# Patient Record
(demographics unavailable — no encounter records)

---

## 2024-12-04 NOTE — PHYSICAL EXAM
[de-identified] : Neurologic: normal mood and affect, orientated and able to communicate Constitutional: well developed and well nourished  Right Knee: patellar tenderness medial tibial plateau tenderness pes bursal swelling and tenderness

## 2024-12-04 NOTE — DATA REVIEWED
[FreeTextEntry1] : 12/3/24 [facility] X-RAY Right Knee 4 views: This scan was reviewed and interpreted by Dr. Farley, and his findings are- patella quita, otherwise unremarkable

## 2024-12-04 NOTE — ADDENDUM
[FreeTextEntry1] : Documented by Armando Henley acting as a scribe for Dr. Farley and Beni Cardoza PA-C on 12/03/2024 and was presence for the following sections: Physical Exam; Data Reviewed; Assessment; Discussion/Summary. All medical record entries made by the Scribe were at my, Dr. Farley, and Beni Cardoza, direction and personally dictated by me on 12/03/2024. I have reviewed the chart and agree that the record accurately reflects my personal performance of the history, physical exam, procedure and imaging. 6

## 2024-12-04 NOTE — ADDENDUM
[FreeTextEntry1] : Documented by Armando Henley acting as a scribe for Dr. Farley and Beni Cardoza PA-C on 12/03/2024 and was presence for the following sections: Physical Exam; Data Reviewed; Assessment; Discussion/Summary. All medical record entries made by the Scribe were at my, Dr. Farley, and Beni Cardoza, direction and personally dictated by me on 12/03/2024. I have reviewed the chart and agree that the record accurately reflects my personal performance of the history, physical exam, procedure and imaging.

## 2024-12-04 NOTE — HISTORY OF PRESENT ILLNESS
[de-identified] : The patient is a 17 year old [RIGHT] hand dominant male who presents today complaining of right knee pain Date of Injury/Onset: 11/27/24 Pain:    At Rest: _/10  With Activity:  _/10  Mechanism of injury: fell on knee during live wrestling This is NOT a Work Related Injury being treated under Worker's Compensation. This IS an athletic injury occurring associated with an interscholastic or organized sports team. Quality of symptoms: sharp pain over distal patella/medial patella border Improves with: rest, activity modification Worse with: inc walking, stairs, wrestling, kneeling Prior treatment: NONE Prior Imaging: NONE Out of work/sport: out of sport since Saturday School/Sport/Position/Occupation: [employer]  Additional Information: Previously treated by Miguelito in December 2023 for LT shoulder injury

## 2024-12-04 NOTE — DISCUSSION/SUMMARY
[de-identified] : Reviewed all X Ray images with patient today and interpretation was provided. MRI of right knee to eval medial tibia plateau stress fracture. No gym, no sport until follow-up visit. Patient will follow up after MRI.     ***[employer]***     ----------------------------------------------- Home Exercise The patient is instructed on a home exercise program.  GIANNA NERI Acting as a Scribe for Dr. Miguelito JONES, Gianna Neri, attest that this documentation has been prepared under the direction and in the presence of Provider Dr. Farley.  Activity Modification The patient was advised to modify their activities.  Dx / Natural History The patient was advised of the diagnosis. The natural history of the pathology was explained in full to the patient in layman's terms. Several different treatment options were discussed and explained in full to the patient including the risks and benefits of both surgical and non-surgical treatments.  All questions and concerns were answered.  Pain Guide Activities The patient was advised to let pain guide the gradual advancement of activities.  RICE I explained to the patient that rest, ice, compression, and elevation would benefit them. They may return to activity after follow-up or when they no longer have any pain.  The patient's current medication management of their orthopedic diagnosis was reviewed today: (1) We discussed a comprehensive treatment plan that included possible pharmaceutical management involving the use of prescription strength medications including but not limited to options such as oral Naprosyn 500mg BID, once daily Meloxicam 15 mg, or 500-650 mg Tylenol versus over the counter oral medications and topical prescription NSAID Pennsaid vs over the counter Voltaren gel. (2) There is a moderate risk of morbidity with further treatment, especially from use of prescription strength medications and possible side effects of these medications which include upset stomach with oral medications, skin reactions to topical medications and cardiac/renal issues with long term use. (3) I recommended that the patient follow-up with their medical physician to discuss any significant specific potential issues with long term medication use such as interactions with current medications or with exacerbation of underlying medical comorbidities. (4) The benefits and risks associated with use of injectable, oral or topical, prescription and over the counter anti-inflammatory medications were discussed with the patient. The patient voiced understanding of the risks including but not limited to bleeding, stroke, kidney dysfunction, heart disease, and were referred to the black box warning label for further information.

## 2024-12-04 NOTE — PHYSICAL EXAM
[de-identified] : Neurologic: normal mood and affect, orientated and able to communicate Constitutional: well developed and well nourished  Right Knee: patellar tenderness medial tibial plateau tenderness pes bursal swelling and tenderness

## 2024-12-04 NOTE — REASON FOR VISIT
Render Post-Care Instructions In Note?: no Detail Level: Detailed Consent: The patient's consent was obtained including but not limited to risks of crusting, scabbing, blistering, scarring, darker or lighter pigmentary change, recurrence, incomplete removal and infection. Duration Of Freeze Thaw-Cycle (Seconds): 1 Post-Care Instructions: I reviewed with the patient in detail post-care instructions. Patient is to wear sunprotection, and avoid picking at any of the treated lesions. Pt may apply Vaseline to crusted or scabbing areas. [FreeTextEntry2] : new injury RT KNEE

## 2024-12-04 NOTE — DISCUSSION/SUMMARY
[de-identified] : Reviewed all X Ray images with patient today and interpretation was provided. MRI of right knee to eval medial tibia plateau stress fracture. No gym, no sport until follow-up visit. Patient will follow up after MRI.     ***[employer]***     ----------------------------------------------- Home Exercise The patient is instructed on a home exercise program.  GIANNA NERI Acting as a Scribe for Dr. Miguelito JONES, Gianna Neri, attest that this documentation has been prepared under the direction and in the presence of Provider Dr. Farley.  Activity Modification The patient was advised to modify their activities.  Dx / Natural History The patient was advised of the diagnosis. The natural history of the pathology was explained in full to the patient in layman's terms. Several different treatment options were discussed and explained in full to the patient including the risks and benefits of both surgical and non-surgical treatments.  All questions and concerns were answered.  Pain Guide Activities The patient was advised to let pain guide the gradual advancement of activities.  RICE I explained to the patient that rest, ice, compression, and elevation would benefit them. They may return to activity after follow-up or when they no longer have any pain.  The patient's current medication management of their orthopedic diagnosis was reviewed today: (1) We discussed a comprehensive treatment plan that included possible pharmaceutical management involving the use of prescription strength medications including but not limited to options such as oral Naprosyn 500mg BID, once daily Meloxicam 15 mg, or 500-650 mg Tylenol versus over the counter oral medications and topical prescription NSAID Pennsaid vs over the counter Voltaren gel. (2) There is a moderate risk of morbidity with further treatment, especially from use of prescription strength medications and possible side effects of these medications which include upset stomach with oral medications, skin reactions to topical medications and cardiac/renal issues with long term use. (3) I recommended that the patient follow-up with their medical physician to discuss any significant specific potential issues with long term medication use such as interactions with current medications or with exacerbation of underlying medical comorbidities. (4) The benefits and risks associated with use of injectable, oral or topical, prescription and over the counter anti-inflammatory medications were discussed with the patient. The patient voiced understanding of the risks including but not limited to bleeding, stroke, kidney dysfunction, heart disease, and were referred to the black box warning label for further information.

## 2024-12-04 NOTE — HISTORY OF PRESENT ILLNESS
[de-identified] : The patient is a 17 year old [RIGHT] hand dominant male who presents today complaining of right knee pain Date of Injury/Onset: 11/27/24 Pain:    At Rest: _/10  With Activity:  _/10  Mechanism of injury: fell on knee during live wrestling This is NOT a Work Related Injury being treated under Worker's Compensation. This IS an athletic injury occurring associated with an interscholastic or organized sports team. Quality of symptoms: sharp pain over distal patella/medial patella border Improves with: rest, activity modification Worse with: inc walking, stairs, wrestling, kneeling Prior treatment: NONE Prior Imaging: NONE Out of work/sport: out of sport since Saturday School/Sport/Position/Occupation: [employer]  Additional Information: Previously treated by Miguelito in December 2023 for LT shoulder injury